# Patient Record
Sex: FEMALE | Race: BLACK OR AFRICAN AMERICAN | NOT HISPANIC OR LATINO
[De-identification: names, ages, dates, MRNs, and addresses within clinical notes are randomized per-mention and may not be internally consistent; named-entity substitution may affect disease eponyms.]

---

## 2021-06-07 ENCOUNTER — APPOINTMENT (OUTPATIENT)
Dept: ORTHOPEDIC SURGERY | Facility: CLINIC | Age: 41
End: 2021-06-07

## 2021-06-07 PROBLEM — Z00.00 ENCOUNTER FOR PREVENTIVE HEALTH EXAMINATION: Status: ACTIVE | Noted: 2021-06-07

## 2021-06-17 ENCOUNTER — APPOINTMENT (OUTPATIENT)
Dept: ORTHOPEDIC SURGERY | Facility: CLINIC | Age: 41
End: 2021-06-17

## 2021-07-01 ENCOUNTER — APPOINTMENT (OUTPATIENT)
Dept: ORTHOPEDIC SURGERY | Facility: CLINIC | Age: 41
End: 2021-07-01
Payer: COMMERCIAL

## 2021-07-01 VITALS — WEIGHT: 128 LBS | HEIGHT: 64 IN | BODY MASS INDEX: 21.85 KG/M2

## 2021-07-01 DIAGNOSIS — M25.512 PAIN IN LEFT SHOULDER: ICD-10-CM

## 2021-07-01 PROCEDURE — 99204 OFFICE O/P NEW MOD 45 MIN: CPT

## 2021-07-01 RX ORDER — LEVOTHYROXINE SODIUM 0.17 MG/1
TABLET ORAL
Refills: 0 | Status: ACTIVE | COMMUNITY

## 2021-07-01 RX ORDER — HYDROMORPHONE HYDROCHLORIDE 2 MG/1
2 TABLET ORAL
Refills: 0 | Status: ACTIVE | COMMUNITY

## 2021-07-01 NOTE — HISTORY OF PRESENT ILLNESS
[de-identified] : CHRONIC LEFT SHOULDER PAIN \par PAIN BEGAN 2021\par CONSTANT \par PAIN LEVEL: 5/10\par DULL,SHARP,BURNING \par BETTER WITH HEAT, CBD ROLL ON, \par WORSE WHEN PUTTING STRAIN  ON ARM \par PT HAS CT AND X-RAY AVAILABLE FROM PCP \par BONE GRAFT SURGERY WITH DR. WILSON- 2012

## 2021-07-01 NOTE — PHYSICAL EXAM
[de-identified] : PHYSICAL EXAM LEFT  SHOULDER\par \par MODERATE SCAPULAR PROTRACTION\par AROM 105 / 90 / 50 / 0\par TENDER: GH JOINT  \par \par  \par \par RC STRENGTH TESTING \par SS:  5/5\par SUB 5/5\par IS     5/5\par BICEPS  5/5\par \par SENSATION  - GROSSLY INTACT\par \par  \par

## 2021-07-01 NOTE — DISCUSSION/SUMMARY
[de-identified] : PREOP SHOULDER SURGERY DISCUSSION: - LEFT TSA \par \par PROCEDURE DISCUSSED - QUESTIONS ANSWERED\par PATIENT WISHES TO PROCEED\par \par POST OP CARE AND LIMITATIONS REVIEWED - HANDOUT PROVIDED \par \par COLD PACKS RECOMMENDED\par ANALGESICS PRESCRIBED \par \par \par THERE ARE NO GUARANTEES THAT ALL SYMPTOMS WILL BE ALLEVIATED  \par SHOULDER ARTHROSCOPY, ACROMIOPLASTY, DEBRIDEMENT,  RC REPAIR AND LABRUM REPAIRS- ON AVERAGE 75- 85% SATISFACTORY RESULTS FOR TEARS < 3CM AFTER 9-12 MONTHS HEALING AND REHABILITATION. \par \par REPAIRS WILL REQUIRE STRICT SHOULDER IMMOBILIZER 4-6 WEEKS\par \par RC TEARS 3CM OR LARGER MAY REQUIRE COLLAGEN PATCH AUGMENTATION GENERALLY HAVE LESS SATISFACTORY RESULTS\par \par PHYSICAL THERAPY REQUIRED 2X WEEK FOR  MINIMUM 8-12 WEEKS FOR ALL PROCEDURES \par CONTINUED HOME EXERCISES 6-9 MONTHS AFTER THAT REQUIRED FOR OPTIMAL OUTCOMES \par \par ROUTINE SURGICAL AND ANESTHETIC RISKS INCLUDE RISK OF SURGICAL INFECTION, ANESTHETIC COMPLICATION OR ALLERGY, POSSIBLE RETEARS OR PROGRESSION OF TEAR, STIFFNESS OF SHOULDER AND UNSATISFACTORY OUTCOMES\par \par PATIENT UNDERSTANDS AND WISHES TO PROCEED\par

## 2021-07-09 ENCOUNTER — TRANSCRIPTION ENCOUNTER (OUTPATIENT)
Age: 41
End: 2021-07-09

## 2021-08-26 ENCOUNTER — APPOINTMENT (OUTPATIENT)
Dept: ORTHOPEDIC SURGERY | Facility: CLINIC | Age: 41
End: 2021-08-26
Payer: COMMERCIAL

## 2021-08-26 PROCEDURE — 73030 X-RAY EXAM OF SHOULDER: CPT | Mod: LT

## 2021-08-26 PROCEDURE — 99213 OFFICE O/P EST LOW 20 MIN: CPT

## 2021-08-26 RX ORDER — ONDANSETRON 8 MG/1
8 TABLET, ORALLY DISINTEGRATING ORAL 3 TIMES DAILY
Qty: 15 | Refills: 0 | Status: ACTIVE | COMMUNITY
Start: 2021-08-26 | End: 1900-01-01

## 2021-08-26 NOTE — HISTORY OF PRESENT ILLNESS
[de-identified] : CHRONIC LEFT SHOULDER PAIN \par FOLLOW UP- \par PAIN LEVEL: 5/10\par MARKED LIMITATIONS , CREPITUS AND PAIN WITH MOVEMENT \par ICE AND MEDICATION AS NEEDED \par AUGUST 31, 2021- LEFT TOTAL SHOULDER ARTHROPLASTY, ROT CUFF REPAIR, BICEPS TENODESIS  -\par JEAN \par

## 2021-08-26 NOTE — PHYSICAL EXAM
[de-identified] : PHYSICAL EXAM LEFT  SHOULDER\par \par MODERATE SCAPULAR PROTRACTION\par AROM 105 / 90 / 50 / 0\par TENDER: GH JOINT  \par \par  \par \par RC STRENGTH TESTING \par SS:  5/5\par SUB 5/5\par IS     5/5\par BICEPS  5/5\par \par SENSATION  - GROSSLY INTACT\par \par  \par  [de-identified] : LEFT SHOULDER XRAY (2 VIEWS - AP AND OUTLET) -  \par NO OBVIOUS FRACTURE , SEPARATION OR DISLOCATION \par FLATTENING HUMERAL HEAD WITH MARGINAL OSTEOPHYTES \par SOME JOINT SPACE REMAINS >50% AT LEAST 3.5MM

## 2021-08-28 ENCOUNTER — LABORATORY RESULT (OUTPATIENT)
Age: 41
End: 2021-08-28

## 2021-08-30 ENCOUNTER — TRANSCRIPTION ENCOUNTER (OUTPATIENT)
Age: 41
End: 2021-08-30

## 2021-08-30 ENCOUNTER — APPOINTMENT (OUTPATIENT)
Dept: ORTHOPEDIC SURGERY | Facility: HOSPITAL | Age: 41
End: 2021-08-30
Payer: COMMERCIAL

## 2021-08-30 VITALS
SYSTOLIC BLOOD PRESSURE: 106 MMHG | RESPIRATION RATE: 18 BRPM | HEART RATE: 74 BPM | HEIGHT: 64 IN | DIASTOLIC BLOOD PRESSURE: 61 MMHG | TEMPERATURE: 97 F | WEIGHT: 126.55 LBS | OXYGEN SATURATION: 100 %

## 2021-08-30 RX ORDER — CHLORHEXIDINE GLUCONATE 213 G/1000ML
1 SOLUTION TOPICAL ONCE
Refills: 0 | Status: DISCONTINUED | OUTPATIENT
Start: 2021-08-31 | End: 2021-09-01

## 2021-08-30 RX ORDER — POVIDONE-IODINE 5 %
1 AEROSOL (ML) TOPICAL ONCE
Refills: 0 | Status: DISCONTINUED | OUTPATIENT
Start: 2021-08-31 | End: 2021-09-01

## 2021-08-30 NOTE — H&P ADULT - NSHPLABSRESULTS_GEN_ALL_CORE
Preop CBC, BMP, PT/INR, PTT within normal range  Cr 0.72  UA negative   3M DOS   Preop EKG NSR and reviewed per medical clearance Preop CBC, BMP, PT/INR, PTT within normal range  Cr 0.72  UA negative   3M DOS   Preop EKG NSR and reviewed per medical clearance  Preop Covid neg 8/28

## 2021-08-30 NOTE — ASU PREOP CHECKLIST - SELECT TESTS ORDERED
BMP/CBC/PT/PTT/INR/Urinalysis/HCG/EKG/COVID-19 HCG negative/BMP/CBC/PT/PTT/INR/Urinalysis/HCG/EKG/COVID-19

## 2021-08-30 NOTE — H&P ADULT - NSHPPHYSICALEXAM_GEN_ALL_CORE
Gen: 40 y/o female,  NAD  MSK: Decreased ROM secondary to pain at the left shoulder       Rest of PE per MD clearance Gen: 42 y/o female,  NAD  MSK: Decreased ROM secondary to pain at the left shoulder   Sensation intact to bilateral UE distally. Motor Strength 5/5 to /interossei/triceps/biceps/deltoid bilaterally. AIN/PIN intact.   Remainder of PE per MD clearance

## 2021-08-30 NOTE — H&P ADULT - HISTORY OF PRESENT ILLNESS
Patient is 42 y/o female with left shoulder pain x  Patient elects to undergo Left total shoulder replacement, rotator cuff repair, biceps, tenodesis  Patient is 40 y/o female with left shoulder pain x chronic. 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 She had bone grafting in approximately 2012 but now her pain has become progressively worse. She denies numbness/tingling/weakness of bilateral upper extremities. Pt does not ambulate with an assistive device at baseline. Pt takes Dilaudid 2-4 mg as needed for pain and sickle cell crises. Pt denies CP, SOB, N/V, tactile fevers, calf pain.     Patient elects to undergo Left total shoulder replacement, rotator cuff repair, biceps, tenodesis

## 2021-08-30 NOTE — H&P ADULT - PROBLEM SELECTOR PLAN 1
Admit to Orthopedic Service   For elective Left total shoulder, RCR, biceps tenodesis   Medically cleared and optimized for surgery by NP [place]

## 2021-08-30 NOTE — H&P ADULT - PROBLEM/PLAN-1
Summary:    Patient is due/failing the following:   LDL, PAP, PHQ9 and PHYSICAL    Action needed:   Patient needs office visit for PAP/PE., Patient needs to do PHQ9. and Patient needs fasting lab only appointment    Type of outreach:    Phone, spoke to patient.  patient declined and will call back when ready.     Questions for provider review:    None                                                                                                                                    Judy Cote       Chart routed to Care Team .          Panel Management Review      Patient has the following on her problem list:     Depression / Dysthymia review    Measure:  Needs PHQ-9 score of 4 or less during index window.  Administer PHQ-9 and if score is 5 or more, send encounter to provider for next steps.        PHQ-9 SCORE 5/25/2016 7/27/2017 3/22/2018   Total Score - - -   Total Score MyChart - - 0   Total Score 6 3 0       If PHQ-9 recheck is 5 or more, route to provider for next steps.    Patient is due for:  PHQ9      Composite cancer screening  Chart review shows that this patient is due/due soon for the following Pap Smear  
DISPLAY PLAN FREE TEXT

## 2021-08-31 ENCOUNTER — INPATIENT (INPATIENT)
Facility: HOSPITAL | Age: 41
LOS: 0 days | Discharge: HOME | DRG: 483 | End: 2021-09-01
Attending: SPECIALIST | Admitting: SPECIALIST
Payer: COMMERCIAL

## 2021-08-31 ENCOUNTER — RESULT REVIEW (OUTPATIENT)
Age: 41
End: 2021-08-31

## 2021-08-31 DIAGNOSIS — M87.00 IDIOPATHIC ASEPTIC NECROSIS OF UNSPECIFIED BONE: ICD-10-CM

## 2021-08-31 DIAGNOSIS — Z98.891 HISTORY OF UTERINE SCAR FROM PREVIOUS SURGERY: Chronic | ICD-10-CM

## 2021-08-31 DIAGNOSIS — E03.9 HYPOTHYROIDISM, UNSPECIFIED: ICD-10-CM

## 2021-08-31 DIAGNOSIS — D57.01 HB-SS DISEASE WITH ACUTE CHEST SYNDROME: ICD-10-CM

## 2021-08-31 DIAGNOSIS — Z98.890 OTHER SPECIFIED POSTPROCEDURAL STATES: Chronic | ICD-10-CM

## 2021-08-31 DIAGNOSIS — Z86.2 PERSONAL HISTORY OF DISEASES OF THE BLOOD AND BLOOD-FORMING ORGANS AND CERTAIN DISORDERS INVOLVING THE IMMUNE MECHANISM: ICD-10-CM

## 2021-08-31 LAB
HCT VFR BLD CALC: 29.7 % — LOW (ref 34.5–45)
HCT VFR BLD CALC: 30.4 % — LOW (ref 34.5–45)
HGB BLD-MCNC: 10.2 G/DL — LOW (ref 11.5–15.5)
HGB BLD-MCNC: 10.6 G/DL — LOW (ref 11.5–15.5)
MCHC RBC-ENTMCNC: 30.7 PG — SIGNIFICANT CHANGE UP (ref 27–34)
MCHC RBC-ENTMCNC: 30.9 PG — SIGNIFICANT CHANGE UP (ref 27–34)
MCHC RBC-ENTMCNC: 34.3 GM/DL — SIGNIFICANT CHANGE UP (ref 32–36)
MCHC RBC-ENTMCNC: 34.9 GM/DL — SIGNIFICANT CHANGE UP (ref 32–36)
MCV RBC AUTO: 88.6 FL — SIGNIFICANT CHANGE UP (ref 80–100)
MCV RBC AUTO: 89.5 FL — SIGNIFICANT CHANGE UP (ref 80–100)
NRBC # BLD: 1 /100 WBCS — HIGH (ref 0–0)
NRBC # BLD: 2 /100 WBCS — HIGH (ref 0–0)
PLATELET # BLD AUTO: 311 K/UL — SIGNIFICANT CHANGE UP (ref 150–400)
PLATELET # BLD AUTO: 326 K/UL — SIGNIFICANT CHANGE UP (ref 150–400)
RBC # BLD: 3.32 M/UL — LOW (ref 3.8–5.2)
RBC # BLD: 3.43 M/UL — LOW (ref 3.8–5.2)
RBC # FLD: 15.4 % — HIGH (ref 10.3–14.5)
RBC # FLD: 15.9 % — HIGH (ref 10.3–14.5)
WBC # BLD: 12.19 K/UL — HIGH (ref 3.8–10.5)
WBC # BLD: 20.89 K/UL — HIGH (ref 3.8–10.5)
WBC # FLD AUTO: 12.19 K/UL — HIGH (ref 3.8–10.5)
WBC # FLD AUTO: 20.89 K/UL — HIGH (ref 3.8–10.5)

## 2021-08-31 PROCEDURE — 73020 X-RAY EXAM OF SHOULDER: CPT | Mod: 26,LT

## 2021-08-31 PROCEDURE — 23430 REPAIR BICEPS TENDON: CPT | Mod: LT,59

## 2021-08-31 PROCEDURE — 23472 RECONSTRUCT SHOULDER JOINT: CPT | Mod: LT

## 2021-08-31 PROCEDURE — 23420 REPAIR OF SHOULDER: CPT | Mod: LT,59

## 2021-08-31 PROCEDURE — 88300 SURGICAL PATH GROSS: CPT | Mod: 26

## 2021-08-31 RX ORDER — CEFAZOLIN SODIUM 1 G
2000 VIAL (EA) INJECTION EVERY 8 HOURS
Refills: 0 | Status: COMPLETED | OUTPATIENT
Start: 2021-08-31 | End: 2021-09-01

## 2021-08-31 RX ORDER — OXYCODONE HYDROCHLORIDE 5 MG/1
10 TABLET ORAL EVERY 4 HOURS
Refills: 0 | Status: DISCONTINUED | OUTPATIENT
Start: 2021-08-31 | End: 2021-08-31

## 2021-08-31 RX ORDER — HYDROMORPHONE HYDROCHLORIDE 2 MG/ML
2 INJECTION INTRAMUSCULAR; INTRAVENOUS; SUBCUTANEOUS EVERY 4 HOURS
Refills: 0 | Status: DISCONTINUED | OUTPATIENT
Start: 2021-08-31 | End: 2021-09-01

## 2021-08-31 RX ORDER — HYDROMORPHONE HYDROCHLORIDE 2 MG/ML
0.5 INJECTION INTRAMUSCULAR; INTRAVENOUS; SUBCUTANEOUS EVERY 4 HOURS
Refills: 0 | Status: DISCONTINUED | OUTPATIENT
Start: 2021-08-31 | End: 2021-09-01

## 2021-08-31 RX ORDER — CEFAZOLIN SODIUM 1 G
2000 VIAL (EA) INJECTION EVERY 8 HOURS
Refills: 0 | Status: DISCONTINUED | OUTPATIENT
Start: 2021-08-31 | End: 2021-08-31

## 2021-08-31 RX ORDER — HYDROMORPHONE HYDROCHLORIDE 2 MG/ML
4 INJECTION INTRAMUSCULAR; INTRAVENOUS; SUBCUTANEOUS EVERY 4 HOURS
Refills: 0 | Status: DISCONTINUED | OUTPATIENT
Start: 2021-08-31 | End: 2021-09-01

## 2021-08-31 RX ORDER — OXYCODONE HYDROCHLORIDE 5 MG/1
5 TABLET ORAL EVERY 4 HOURS
Refills: 0 | Status: DISCONTINUED | OUTPATIENT
Start: 2021-08-31 | End: 2021-08-31

## 2021-08-31 RX ORDER — HYDROMORPHONE HYDROCHLORIDE 2 MG/ML
1 INJECTION INTRAMUSCULAR; INTRAVENOUS; SUBCUTANEOUS
Qty: 0 | Refills: 0 | DISCHARGE

## 2021-08-31 RX ORDER — HYDROMORPHONE HYDROCHLORIDE 2 MG/ML
0.5 INJECTION INTRAMUSCULAR; INTRAVENOUS; SUBCUTANEOUS
Refills: 0 | Status: DISCONTINUED | OUTPATIENT
Start: 2021-08-31 | End: 2021-09-01

## 2021-08-31 RX ORDER — LEVOTHYROXINE SODIUM 125 MCG
112 TABLET ORAL DAILY
Refills: 0 | Status: DISCONTINUED | OUTPATIENT
Start: 2021-08-31 | End: 2021-09-01

## 2021-08-31 RX ORDER — FOLIC ACID 0.8 MG
1 TABLET ORAL
Qty: 0 | Refills: 0 | DISCHARGE

## 2021-08-31 RX ORDER — LEVOTHYROXINE SODIUM 125 MCG
1 TABLET ORAL
Qty: 0 | Refills: 0 | DISCHARGE

## 2021-08-31 RX ADMIN — HYDROMORPHONE HYDROCHLORIDE 0.5 MILLIGRAM(S): 2 INJECTION INTRAMUSCULAR; INTRAVENOUS; SUBCUTANEOUS at 20:04

## 2021-08-31 RX ADMIN — HYDROMORPHONE HYDROCHLORIDE 0.5 MILLIGRAM(S): 2 INJECTION INTRAMUSCULAR; INTRAVENOUS; SUBCUTANEOUS at 20:25

## 2021-08-31 RX ADMIN — HYDROMORPHONE HYDROCHLORIDE 4 MILLIGRAM(S): 2 INJECTION INTRAMUSCULAR; INTRAVENOUS; SUBCUTANEOUS at 23:49

## 2021-08-31 RX ADMIN — Medication 100 MILLIGRAM(S): at 22:50

## 2021-08-31 RX ADMIN — HYDROMORPHONE HYDROCHLORIDE 4 MILLIGRAM(S): 2 INJECTION INTRAMUSCULAR; INTRAVENOUS; SUBCUTANEOUS at 22:49

## 2021-08-31 RX ADMIN — HYDROMORPHONE HYDROCHLORIDE 0.5 MILLIGRAM(S): 2 INJECTION INTRAMUSCULAR; INTRAVENOUS; SUBCUTANEOUS at 20:19

## 2021-08-31 RX ADMIN — HYDROMORPHONE HYDROCHLORIDE 0.5 MILLIGRAM(S): 2 INJECTION INTRAMUSCULAR; INTRAVENOUS; SUBCUTANEOUS at 20:40

## 2021-09-01 ENCOUNTER — TRANSCRIPTION ENCOUNTER (OUTPATIENT)
Age: 41
End: 2021-09-01

## 2021-09-01 VITALS
TEMPERATURE: 98 F | OXYGEN SATURATION: 99 % | RESPIRATION RATE: 16 BRPM | HEART RATE: 79 BPM | DIASTOLIC BLOOD PRESSURE: 69 MMHG | SYSTOLIC BLOOD PRESSURE: 108 MMHG

## 2021-09-01 LAB
ANION GAP SERPL CALC-SCNC: 10 MMOL/L — SIGNIFICANT CHANGE UP (ref 5–17)
BASOPHILS # BLD AUTO: 0.04 K/UL — SIGNIFICANT CHANGE UP (ref 0–0.2)
BASOPHILS NFR BLD AUTO: 0.2 % — SIGNIFICANT CHANGE UP (ref 0–2)
BUN SERPL-MCNC: 6 MG/DL — LOW (ref 7–23)
CALCIUM SERPL-MCNC: 9 MG/DL — SIGNIFICANT CHANGE UP (ref 8.4–10.5)
CHLORIDE SERPL-SCNC: 101 MMOL/L — SIGNIFICANT CHANGE UP (ref 96–108)
CO2 SERPL-SCNC: 23 MMOL/L — SIGNIFICANT CHANGE UP (ref 22–31)
COVID-19 SPIKE DOMAIN AB INTERP: POSITIVE
COVID-19 SPIKE DOMAIN ANTIBODY RESULT: >250 U/ML — HIGH
CREAT SERPL-MCNC: 0.67 MG/DL — SIGNIFICANT CHANGE UP (ref 0.5–1.3)
EOSINOPHIL # BLD AUTO: 0 K/UL — SIGNIFICANT CHANGE UP (ref 0–0.5)
EOSINOPHIL NFR BLD AUTO: 0 % — SIGNIFICANT CHANGE UP (ref 0–6)
GLUCOSE SERPL-MCNC: 125 MG/DL — HIGH (ref 70–99)
HCT VFR BLD CALC: 28.1 % — LOW (ref 34.5–45)
HGB BLD-MCNC: 9.7 G/DL — LOW (ref 11.5–15.5)
IMM GRANULOCYTES NFR BLD AUTO: 0.6 % — SIGNIFICANT CHANGE UP (ref 0–1.5)
LYMPHOCYTES # BLD AUTO: 1.47 K/UL — SIGNIFICANT CHANGE UP (ref 1–3.3)
LYMPHOCYTES # BLD AUTO: 8.4 % — LOW (ref 13–44)
MCHC RBC-ENTMCNC: 30.4 PG — SIGNIFICANT CHANGE UP (ref 27–34)
MCHC RBC-ENTMCNC: 34.5 GM/DL — SIGNIFICANT CHANGE UP (ref 32–36)
MCV RBC AUTO: 88.1 FL — SIGNIFICANT CHANGE UP (ref 80–100)
MONOCYTES # BLD AUTO: 1.44 K/UL — HIGH (ref 0–0.9)
MONOCYTES NFR BLD AUTO: 8.2 % — SIGNIFICANT CHANGE UP (ref 2–14)
NEUTROPHILS # BLD AUTO: 14.46 K/UL — HIGH (ref 1.8–7.4)
NEUTROPHILS NFR BLD AUTO: 82.6 % — HIGH (ref 43–77)
NRBC # BLD: 1 /100 WBCS — HIGH (ref 0–0)
PLATELET # BLD AUTO: 305 K/UL — SIGNIFICANT CHANGE UP (ref 150–400)
POTASSIUM SERPL-MCNC: 3.9 MMOL/L — SIGNIFICANT CHANGE UP (ref 3.5–5.3)
POTASSIUM SERPL-SCNC: 3.9 MMOL/L — SIGNIFICANT CHANGE UP (ref 3.5–5.3)
RBC # BLD: 3.19 M/UL — LOW (ref 3.8–5.2)
RBC # FLD: 15.7 % — HIGH (ref 10.3–14.5)
SARS-COV-2 IGG+IGM SERPL QL IA: >250 U/ML — HIGH
SARS-COV-2 IGG+IGM SERPL QL IA: POSITIVE
SODIUM SERPL-SCNC: 134 MMOL/L — LOW (ref 135–145)
WBC # BLD: 17.51 K/UL — HIGH (ref 3.8–10.5)
WBC # FLD AUTO: 17.51 K/UL — HIGH (ref 3.8–10.5)

## 2021-09-01 PROCEDURE — 36430 TRANSFUSION BLD/BLD COMPNT: CPT

## 2021-09-01 PROCEDURE — 80048 BASIC METABOLIC PNL TOTAL CA: CPT

## 2021-09-01 PROCEDURE — 86900 BLOOD TYPING SEROLOGIC ABO: CPT

## 2021-09-01 PROCEDURE — 85027 COMPLETE CBC AUTOMATED: CPT

## 2021-09-01 PROCEDURE — 76000 FLUOROSCOPY <1 HR PHYS/QHP: CPT

## 2021-09-01 PROCEDURE — 73020 X-RAY EXAM OF SHOULDER: CPT

## 2021-09-01 PROCEDURE — C1713: CPT

## 2021-09-01 PROCEDURE — 86923 COMPATIBILITY TEST ELECTRIC: CPT

## 2021-09-01 PROCEDURE — 86769 SARS-COV-2 COVID-19 ANTIBODY: CPT

## 2021-09-01 PROCEDURE — P9016: CPT

## 2021-09-01 PROCEDURE — 36415 COLL VENOUS BLD VENIPUNCTURE: CPT

## 2021-09-01 PROCEDURE — C1776: CPT

## 2021-09-01 PROCEDURE — 85025 COMPLETE CBC W/AUTO DIFF WBC: CPT

## 2021-09-01 PROCEDURE — 86850 RBC ANTIBODY SCREEN: CPT

## 2021-09-01 PROCEDURE — 86901 BLOOD TYPING SEROLOGIC RH(D): CPT

## 2021-09-01 RX ADMIN — HYDROMORPHONE HYDROCHLORIDE 4 MILLIGRAM(S): 2 INJECTION INTRAMUSCULAR; INTRAVENOUS; SUBCUTANEOUS at 09:14

## 2021-09-01 RX ADMIN — Medication 112 MICROGRAM(S): at 05:46

## 2021-09-01 RX ADMIN — HYDROMORPHONE HYDROCHLORIDE 4 MILLIGRAM(S): 2 INJECTION INTRAMUSCULAR; INTRAVENOUS; SUBCUTANEOUS at 10:05

## 2021-09-01 RX ADMIN — Medication 100 MILLIGRAM(S): at 05:46

## 2021-09-01 RX ADMIN — HYDROMORPHONE HYDROCHLORIDE 0.5 MILLIGRAM(S): 2 INJECTION INTRAMUSCULAR; INTRAVENOUS; SUBCUTANEOUS at 02:29

## 2021-09-01 RX ADMIN — HYDROMORPHONE HYDROCHLORIDE 0.5 MILLIGRAM(S): 2 INJECTION INTRAMUSCULAR; INTRAVENOUS; SUBCUTANEOUS at 03:00

## 2021-09-01 NOTE — DISCHARGE NOTE PROVIDER - HOSPITAL COURSE
Admit to Orthopaedics for left total shoulder replacement   Perioperative Antibiotics  DVT prophylaxis  Pain Management

## 2021-09-01 NOTE — DISCHARGE NOTE NURSING/CASE MANAGEMENT/SOCIAL WORK - PATIENT PORTAL LINK FT
You can access the FollowMyHealth Patient Portal offered by [facility] by registering at the following website: [email or web address]. By joining [facility]’s FollowMyHealth portal, you will also be able to view your health information using other applications (apps) compatible with our system.

## 2021-09-01 NOTE — DISCHARGE NOTE PROVIDER - CARE PROVIDER_API CALL
ReYuniel)  Orthopaedic Surgery  [address], 10th [place] [address]  Phone: [phone]  Fax: [phone]  Follow Up Time:    Yuniel Becerra)  Orthopaedic Surgery  [address] [facility] [facility], [address]th [place] [address]  Phone: [phone]  Fax: [phone]  Scheduled Appointment: 09/03/2021

## 2021-09-01 NOTE — DISCHARGE NOTE PROVIDER - NSDCFUADDINST_GEN_ALL_CORE_FT
KEEP YOUR SLING ON AT ALL TIMES as directed by Dr. Becerra   Take pain medication as prescribed by Dr. Becerra   Swelling may travel all the way down arm to hand, this is normal and will subside in a few weeks.  Follow up with Dr. Becerra to schedule an appt, if you have staples or sutures they will be removed in office.  Contact your doctor if you experience: fever greater than 101.5, chills, chest pain, difficulty breathing, redness or excessive drainage around the incision, other concerns.  KEEP YOUR SLING ON AT ALL TIMES as directed by Dr. Becerra. Keep your left arm immobilized- do not use until instructed by Dr. Becerra. Please dress over the sling, keeping in the arm in the sling, against the body. your forearm should be at a 90 degree angle- parallel to your waist, keep your thumb through the loop in the sling, keep the waistband attached to the sling around your waist.   Take pain medication as prescribed by Dr. Becerra   Swelling may travel all the way down arm to hand, this is normal and will subside in a few weeks.   Follow up with Dr. Becerra to schedule an appt, you should have an appt on 09/03/21, please call his office to confirm.  Contact your doctor if you experience: fever greater than 101.5, chills, chest pain, difficulty breathing, redness or excessive drainage around the incision, other concerns.  KEEP YOUR SLING ON AT ALL TIMES as directed by Dr. Becerra. Keep your left arm immobilized- do not use until instructed by Dr. Becerra. Please dress over the sling, keeping in the arm in the sling, against the body. your forearm should be at a 90 degree angle- parallel to your waist, keep your thumb through the loop in the sling, keep the waistband attached to the sling around your waist.   Take pain medication as prescribed by Dr. Becerra   Swelling may travel all the way down arm to hand, this is normal and will subside in a few weeks.   Follow-up with Dr. Becerra to schedule an appt, you should have an appt on 09/03/21, please call his office to confirm.  Follow-up with your provider managing your sickle cell disease for continued monitoring.  Contact your doctor if you experience: fever greater than 101.5, chills, chest pain, difficulty breathing, redness or excessive drainage around the incision, other concerns.

## 2021-09-01 NOTE — PROGRESS NOTE ADULT - SUBJECTIVE AND OBJECTIVE BOX
Ortho Note    Pt comfortable without complaints, pain relatively well controlled.  Denies CP, SOB, N/V, numbness/tingling.  She has been in her sling  She is tolerating food. She has been voiding without complication. No BM yet    Vital Signs Last 24 Hrs  T(C): 36.9 (09-01-21 @ 08:19), Max: 36.9 (09-01-21 @ 08:19)  T(F): 98.4 (09-01-21 @ 08:19), Max: 98.4 (09-01-21 @ 08:19)  HR: 82 (09-01-21 @ 08:19) (60 - 82)  BP: 103/54 (09-01-21 @ 08:19) (103/54 - 116/68)  BP(mean): --  RR: 15 (09-01-21 @ 08:19) (14 - 15)  SpO2: 100% (09-01-21 @ 08:19) (100% - 100%)  I&O's Summary    31 Aug 2021 07:01  -  01 Sep 2021 07:00  --------------------------------------------------------  IN: 0 mL / OUT: 1400 mL / NET: -1400 mL        General: Pt Alert and oriented, NAD  Aquacel over left shoulder, arm resting in sling  Pulses: RP 2+ left UE  Sensation: General sensation to light touch intact left UE  Motor: L  strength 5/5, AIN/PIN/ulnar/median/radial n intact                          9.7    17.51 )-----------( 305      ( 01 Sep 2021 06:25 )             28.1     09-01    134<L>  |  101  |  6<L>  ----------------------------<  125<H>  3.9   |  23  |  0.67    Ca    9.0      01 Sep 2021 06:25        A/P: 41yFemale POD# 1 s/p left TSR   - Stable  - Pain Control  - DVT ppx: SCDs  - PT, WBS: NWB L UE. Stay in sling  - Patient instructed on proper use of sling/immobilizer.     Ortho Pager [phone]

## 2021-09-01 NOTE — PROGRESS NOTE ADULT - SUBJECTIVE AND OBJECTIVE BOX
POST OPERATIVE DAY #:  1  STATUS POST: L shoulder kane    SUBJECTIVE: Patient seen and examined. Pain controlled. ELÍAS overnight.    OBJECTIVE:     Vital Signs Last 24 Hrs  T(C): 36.4 (01 Sep 2021 06:07), Max: 37.2 (31 Aug 2021 19:08)  T(F): 97.6 (01 Sep 2021 06:07), Max: 98.9 (31 Aug 2021 19:08)  HR: 60 (01 Sep 2021 06:07) (60 - 100)  BP: 116/68 (01 Sep 2021 06:07) (112/62 - 134/70)  BP(mean): 97 (31 Aug 2021 20:35) (83 - 105)  RR: 14 (01 Sep 2021 06:07) (12 - 20)  SpO2: 100% (01 Sep 2021 06:07) (94% - 100%)    Affected extremity:          Dressing: Aquacell CDI           Sensation: intact to light touch ax/MABC/LABC/med/rad/uln         Motor exam:       Upper extremity              Bi(c5)  WE(c6)  EE(c7)   FF(c8)                                                R         5/5        5/5        5/5       5/5                                               L          5/5        5/5        5/5       5/5    LABS:                        9.7    17.51 )-----------( 305      ( 01 Sep 2021 06:25 )             28.1     09-01    134<L>  |  101  |  6<L>  ----------------------------<  125<H>  3.9   |  23  |  0.67    Ca    9.0      01 Sep 2021 06:25            MEDICATIONS:chlorhexidine 2% Cloths 1 Application(s) Topical once  HYDROmorphone   Tablet 4 milliGRAM(s) Oral every 4 hours PRN  HYDROmorphone   Tablet 2 milliGRAM(s) Oral every 4 hours PRN  HYDROmorphone  Injectable 0.5 milliGRAM(s) IV Push every 4 hours PRN  HYDROmorphone  Injectable 0.5 milliGRAM(s) IV Push every 15 minutes PRN  levothyroxine 112 MICROGram(s) Oral daily  povidone iodine 5% Nasal Swab 1 Application(s) Both Nostrils once    Anticoagulation:      Antibiotics:       Pain medications:   HYDROmorphone   Tablet 4 milliGRAM(s) Oral every 4 hours PRN  HYDROmorphone   Tablet 2 milliGRAM(s) Oral every 4 hours PRN  HYDROmorphone  Injectable 0.5 milliGRAM(s) IV Push every 4 hours PRN  HYDROmorphone  Injectable 0.5 milliGRAM(s) IV Push every 15 minutes PRN      RADIOLOGY & ADDITIONAL STUDIES:    A/P :  chlorhexidine 2% Cloths 1 Application(s) Topical once  HYDROmorphone   Tablet 4 milliGRAM(s) Oral every 4 hours PRN  HYDROmorphone   Tablet 2 milliGRAM(s) Oral every 4 hours PRN  HYDROmorphone  Injectable 0.5 milliGRAM(s) IV Push every 4 hours PRN  HYDROmorphone  Injectable 0.5 milliGRAM(s) IV Push every 15 minutes PRN  levothyroxine 112 MICROGram(s) Oral daily  povidone iodine 5% Nasal Swab 1 Application(s) Both Nostrils once     41 F s/p L shoulder kane    -    Pain control  -    DVT ppx: SCDs  -    Periop abx:  Ancef   -    NWB LUE  -    Sling   -    Resume home meds  -    Physical Therapy/OT  -    Dispo: Home today

## 2021-09-01 NOTE — OCCUPATIONAL THERAPY INITIAL EVALUATION ADULT - PERTINENT HX OF CURRENT PROBLEM, REHAB EVAL
42 y/o F presents with left shoulder avascular necrosis. Pt is now POD1 s/p L Total shoulder replacement

## 2021-09-01 NOTE — DISCHARGE NOTE NURSING/CASE MANAGEMENT/SOCIAL WORK - NSDCPEFALRISK_GEN_ALL_CORE
For information on Fall & injury Prevention, visit [email or web address]

## 2021-09-01 NOTE — DISCHARGE NOTE PROVIDER - NSDCCPCAREPLAN_GEN_ALL_CORE_FT
PRINCIPAL DISCHARGE DIAGNOSIS  Diagnosis: Bone avascular necrosis  Assessment and Plan of Treatment:

## 2021-09-01 NOTE — DISCHARGE NOTE PROVIDER - NSDCMRMEDTOKEN_GEN_ALL_CORE_FT
Dilaudid 2 mg oral tablet: 1 tab(s) orally every 4 hours  folic acid 1 mg oral tablet: 1 tab(s) orally once a day  levothyroxine 112 mcg (0.112 mg) oral tablet: 1 tab(s) orally once a day  Multiple Vitamins oral tablet: 1 tab(s) orally once a day

## 2021-09-01 NOTE — DISCHARGE NOTE PROVIDER - NSCORESITESY/N_GEN_A_CORE_RD
Will refill for one months wes.  Needs office visit complete before future refills.      fluticasone (FLONASE) 50 MCG/ACT nasal spray  Last Written Prescription Date: 6/13/16  Last Fill Quantity: 16g,  # refills: 11   Last Office Visit with [facility], [facility] or [facility] prescribing provider: 6/13/16                                          Joesph Goddard RN     No

## 2021-09-03 ENCOUNTER — APPOINTMENT (OUTPATIENT)
Dept: ORTHOPEDIC SURGERY | Facility: CLINIC | Age: 41
End: 2021-09-03
Payer: COMMERCIAL

## 2021-09-03 PROBLEM — D57.01 HB-SS DISEASE WITH ACUTE CHEST SYNDROME: Chronic | Status: ACTIVE | Noted: 2021-08-30

## 2021-09-03 PROBLEM — E03.9 HYPOTHYROIDISM, UNSPECIFIED: Chronic | Status: ACTIVE | Noted: 2021-08-30

## 2021-09-03 PROBLEM — Z86.2 PERSONAL HISTORY OF DISEASES OF THE BLOOD AND BLOOD-FORMING ORGANS AND CERTAIN DISORDERS INVOLVING THE IMMUNE MECHANISM: Chronic | Status: ACTIVE | Noted: 2021-08-30

## 2021-09-03 PROCEDURE — 99024 POSTOP FOLLOW-UP VISIT: CPT

## 2021-09-03 PROCEDURE — 73030 X-RAY EXAM OF SHOULDER: CPT | Mod: LT

## 2021-09-03 NOTE — DISCUSSION/SUMMARY
[de-identified] : AUGUST 31, 2021- LEFT SHOULDER ECLIPSE HEMIARTHROPLASTY, ROT CUFF REPAIR, BICEPS TENODESIS\par \par SHOULDER IMMOBILIZER REAPPLIED - INSTRUCTIONS FOR USE\par ELBOW WRIST HAND AROM , SCAP RETRACTION \par START SMALL PENDULUMS \par \par F/U 1 WEEK - STAPLES OUT AND PULLEY

## 2021-09-03 NOTE — PHYSICAL EXAM
[de-identified] : POST OP SHOULDER EXAM \par \par BANDAGE CLEAN AND DRY \par \par AROM  NT - PENDULUM DEMONSTRATED \par \par DISTAL CMS INTACT\par  [de-identified] : LEFT SHOULDER XRAY (2 VIEWS - AP AND OUTLET) -  \par ECLIPSE HEMIARTHROPLASTY - INPLANT INTACT \par AUGUST 31, 2021- LEFT SHOULDER ECLIPSE HEMIARTHROPLASTY, ROT CUFF REPAIR, BICEPS TENODESIS\par NO OBVIOUS FRACTURE , SEPARATION OR DISLOCATION \par

## 2021-09-03 NOTE — HISTORY OF PRESENT ILLNESS
[de-identified] : CHRONIC LEFT SHOULDER PAIN \par POST OP \par FOLLOW UP- \par PAIN LEVEL: 6/10\par MARKED LIMITATIONS , CREPITUS AND PAIN WITH MOVEMENT \par AUGUST 31, 2021- LEFT SHOULDER ECLIPSE HEMIARTHROPLASTY, ROT CUFF REPAIR, BICEPS TENODESIS  -\par JEAN \par

## 2021-09-10 ENCOUNTER — APPOINTMENT (OUTPATIENT)
Dept: ORTHOPEDIC SURGERY | Facility: CLINIC | Age: 41
End: 2021-09-10
Payer: COMMERCIAL

## 2021-09-10 PROCEDURE — 99024 POSTOP FOLLOW-UP VISIT: CPT

## 2021-09-10 RX ORDER — HYDROMORPHONE HYDROCHLORIDE 4 MG/1
4 TABLET ORAL
Qty: 42 | Refills: 0 | Status: ACTIVE | COMMUNITY
Start: 2021-08-26 | End: 1900-01-01

## 2021-09-10 NOTE — HISTORY OF PRESENT ILLNESS
[de-identified] : CHRONIC LEFT SHOULDER PAIN \par POST OP \par FOLLOW UP- \par PAIN LEVEL: 5/10\par MARKED LIMITATIONS , CREPITUS AND PAIN WITH MOVEMENT \par AUGUST 31, 2021- LEFT SHOULDER ECLIPSE HEMIARTHROPLASTY, ROT CUFF REPAIR, BICEPS TENODESIS  -\par JEAN \par

## 2021-09-10 NOTE — PHYSICAL EXAM
[de-identified] : POST OP SHOULDER EXAM \par \par STAPLES OUT - WATERPROOF BANDAID FOR ONE WEEKS\par \par AROM  NT - PENDULUM AND PULLEY  DEMONSTRATED - NO EXT ROTATION > 30 DEGREES  \par \par DISTAL CMS INTACT\par

## 2021-09-12 LAB — SURGICAL PATHOLOGY STUDY: SIGNIFICANT CHANGE UP
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
## 2021-09-13 DIAGNOSIS — M87.812 OTHER OSTEONECROSIS, LEFT SHOULDER: ICD-10-CM
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
## 2021-09-13 DIAGNOSIS — D57.1 SICKLE-CELL DISEASE WITHOUT CRISIS: ICD-10-CM

## 2021-09-13 DIAGNOSIS — E03.9 HYPOTHYROIDISM, UNSPECIFIED: ICD-10-CM

## 2021-09-13 DIAGNOSIS — M19.012 PRIMARY OSTEOARTHRITIS, LEFT SHOULDER: ICD-10-CM

## 2021-09-13 DIAGNOSIS — S46.212A STRAIN OF MUSCLE, FASCIA AND TENDON OF OTHER PARTS OF BICEPS, LEFT ARM, INITIAL ENCOUNTER: ICD-10-CM

## 2021-09-13 DIAGNOSIS — M75.112 INCOMPLETE ROTATOR CUFF TEAR OR RUPTURE OF LEFT SHOULDER, NOT SPECIFIED AS TRAUMATIC: ICD-10-CM

## 2021-09-29 ENCOUNTER — APPOINTMENT (OUTPATIENT)
Dept: ORTHOPEDIC SURGERY | Facility: CLINIC | Age: 41
End: 2021-09-29
Payer: COMMERCIAL

## 2021-09-29 PROCEDURE — 99024 POSTOP FOLLOW-UP VISIT: CPT

## 2021-09-29 NOTE — PHYSICAL EXAM
[de-identified] : POST OP SHOULDER EXAM - VIA VIDEO\par \par WOUND CLEAN AND DRY WO REDNESS WARMTH\par \par AAROM - 110 / 70 \par ELBOW WRIST HAND - WNL\par

## 2021-09-29 NOTE — DISCUSSION/SUMMARY
[de-identified] : AUGUST 31, 2021- LEFT SHOULDER ECLIPSE HEMIARTHROPLASTY, ROT CUFF REPAIR, BICEPS TENODESIS\par \par \par ELBOW WRIST HAND AROM , SCAP RETRACTION \par AAROM 3 X DAY \par PULLEY EXERCISES 1, 2 AND 3\par D/C SLING AT HOME \par STOP SLING OUTSIDE HOME @ 6 WEEKS\par \par START P.T. 2X WEEK\par  \par FU 4 WEEKS

## 2021-09-29 NOTE — HISTORY OF PRESENT ILLNESS
[de-identified] : CHRONIC LEFT SHOULDER PAIN \par POST OP \par FOLLOW UP\par PAIN LEVEL: 5-6/10\par MARKED LIMITATIONS , CREPITUS AND PAIN WITH MOVEMENT \par PT IS DOING POST OP EXERCISES 3 X DAY-HELPFUL \par PT STARTED PULLEY-HELPFUL \par AUGUST 31, 2021- LEFT SHOULDER ECLIPSE HEMIARTHROPLASTY, ROT CUFF REPAIR, BICEPS TENODESIS  -\par PT DID NOT GET TO COME IN FOR HER APPOINTMENT DUE TO A FAMILY EMERGENCY-[place] TAKING CARE OF HER MOM \par \par

## 2021-10-27 ENCOUNTER — APPOINTMENT (OUTPATIENT)
Dept: ORTHOPEDIC SURGERY | Facility: CLINIC | Age: 41
End: 2021-10-27
Payer: COMMERCIAL

## 2021-10-27 DIAGNOSIS — M87.019 IDIOPATHIC ASEPTIC NECROSIS OF UNSPECIFIED SHOULDER: ICD-10-CM

## 2021-10-27 DIAGNOSIS — M19.012 PRIMARY OSTEOARTHRITIS, LEFT SHOULDER: ICD-10-CM

## 2021-10-27 PROCEDURE — 99024 POSTOP FOLLOW-UP VISIT: CPT

## 2021-10-27 NOTE — DISCUSSION/SUMMARY
[de-identified] : AUGUST 31, 2021- LEFT SHOULDER ECLIPSE HEMIARTHROPLASTY, ROT CUFF REPAIR, BICEPS TENODESIS\par \par \par ELBOW WRIST HAND AROM , SCAP RETRACTION \par AAROM 3 X DAY \par PULLEY EXERCISES 1, 2 AND 3\par D/C SLING \par \par START P.T. 2X WEEK\par  \par FU 6 WEEKS

## 2021-10-27 NOTE — HISTORY OF PRESENT ILLNESS
[de-identified] : CHRONIC LEFT SHOULDER PAIN \par POST OP 8 WEEKS \par FOLLOW UP\par PAIN LEVEL: 2/10\par MARKED LIMiTATIONS , CREPITUS AND PAIN WITH MOVEMENT \par PT IS DOING POST OP EXERCISES 3 X DAY-HELPFUL \par PT STARTED PULLEY-HELPFUL \par AUGUST 31, 2021- LEFT SHOULDER ECLIPSE HEMIARTHROPLASTY, ROT CUFF REPAIR, BICEPS TENODESIS\par PT DID NOT GET TO COME IN FOR HER APPOINTMENT DUE TO A FAMILY EMERGENCY-[place] TAKING CARE OF HER MOM \par PT DID NOT START P.T AS YET- NEEDS P.T REFERRAL \par \par

## 2021-10-27 NOTE — PHYSICAL EXAM
[de-identified] : POST OP SHOULDER EXAM - VIA VIDEO\par \par AUGUST 31, 2021- LEFT SHOULDER ECLIPSE HEMIARTHROPLASTY, ROT CUFF REPAIR, BICEPS TENODESIS\par \par WOUND CLEAN AND DRY WO REDNESS WARMTH\par \par AAROM - 120 / 90 \par ELBOW WRIST HAND - WNL\par

## 2023-07-24 NOTE — ASU PATIENT PROFILE, ADULT - HARM RISK FACTORS
Pt alert/awake with mom at cartside. Discharge and follow up reviewed. Mom verbalized understanding.  No questions at this time. Ok to dc per ER NP.   no

## 2024-12-23 NOTE — DISCHARGE NOTE PROVIDER - NSDCHC_MEDRECSTATUS_GEN_ALL_CORE
Admission Reconciliation is Completed  Discharge Reconciliation is Completed